# Patient Record
(demographics unavailable — no encounter records)

---

## 2024-10-07 NOTE — PHYSICAL EXAM
[JVD] : no jugular venous distention  [Normal Breath Sounds] : Normal breath sounds [Normal Heart Sounds] : normal heart sounds [Alert] : alert [Oriented to Person] : oriented to person [Oriented to Place] : oriented to place [Oriented to Time] : oriented to time [Calm] : calm [de-identified] : soft, no recurrence [de-identified] : moves all 4 extr 5/5, no swelling

## 2024-10-07 NOTE — PHYSICAL EXAM
[Chaperone Declined] : Patient declined chaperone [Appropriately responsive] : appropriately responsive [Alert] : alert [No Acute Distress] : no acute distress [Soft] : soft [Non-tender] : non-tender [Non-distended] : non-distended [No HSM] : No HSM [No Lesions] : no lesions [No Mass] : no mass [Oriented x3] : oriented x3 [Labia Majora] : normal [Labia Minora] : normal [Normal] : normal [Absent] : absent [Uterine Adnexae] : normal [FreeTextEntry7] : INCISIONS HEALING WELL

## 2024-12-16 NOTE — PLAN
[FreeTextEntry1] : 49 YO FEMALE PRESENTS FOR ANNUAL GYN EXAMINATION. SELF BREAST EXAMINATION TAUGHT. MAMMOGRAM ORDERED. EXERCISE, CALCIUM AND VITAMIN D3 SUPPLEMENTATION DISCUSSED. BONE DENSITY STUDY AND INDICATIONS REVIEWED. COLONOSCOPY HISTORY REVIEWED AND DISCUSSED FOLLOWUP.

## 2024-12-16 NOTE — PHYSICAL EXAM
[Chaperone Declined] : Patient declined chaperone [Appropriately responsive] : appropriately responsive [Alert] : alert [No Acute Distress] : no acute distress [Soft] : soft [Non-tender] : non-tender [Non-distended] : non-distended [No HSM] : No HSM [No Lesions] : no lesions [No Mass] : no mass [Oriented x3] : oriented x3 [Examination Of The Breasts] : a normal appearance [No Masses] : no breast masses were palpable [Labia Minora] : normal [Labia Majora] : normal [Normal] : normal [Absent] : absent

## 2024-12-16 NOTE — HISTORY OF PRESENT ILLNESS
[FreeTextEntry1] : 47 YO F PRESENTS FOR ANNUAL VISIT. PT FEELS WELL AND OFFERS NO COMPLAINTS. S/P PARTIAL HYSTERECTOMY 9/24/24. PT DENIES ANY ISSUES AFTER SURGERY.  LPAP 11/7/22: NEGATIVE LMAMMO 1/24/24: BIRADS-2

## 2025-02-24 NOTE — ASSESSMENT
[FreeTextEntry1] : Annual ordered comprehensive blood work , screen for hyperlipidemia , diabetes and thyroid disorder. will review the results and address if abnormal. she is up to date on mammo and PAP : result  normal. Deferred  flu vaccine up to date in  tdap vaccine. Declined for HIV and Hep C  screening test. alcohol screening :SIBRT:4 Depression screening:PHQ2:0 up to date with   colonoscopy, she need to have repeat in 3 yrs. EKG: Normal , no acute changes sleep apnea screening: low  Asthma , allergy: controlled , takes zyrtec every day   obesity: BMI:35 Advised low carb , Mediterranean diet, avoid carbonated beverage , added sugar and sweets. exercise min 150 min/wk. portion control, maintain a food diary. drink plenty of water. pt. denied any H/o thyroid CA , familial medullary CA, MEN type 2, Pancreatitis. prescribed Wegovy 0.25 mg once a week for 4 weeks then increase to 0.5 mg for 2 months follow-up in 3 months after starting on treatment.  b/l knee avani: stated she is having intermittent pain

## 2025-02-24 NOTE — HISTORY OF PRESENT ILLNESS
[FreeTextEntry1] : pt. here for well ness exam [de-identified] : Ms. OPHELIA ROSENTHAL  is  for annual wellness exam. Asthma symptoms are well controlled , no cough takes  Breo as needed, continues to take Zyrtec for allergy.   Patient stated she is having difficulty losing weight. she has seen nutritionist , she tried wt. loss stanley, she exercise every day , nothing is helping her to loose wt. no change is bowel and bladder habit .

## 2025-02-24 NOTE — HEALTH RISK ASSESSMENT
[Excellent] : ~his/her~  mood as  excellent [3 or 4 (1 pt)] : 3 or 4  (1 point) [No] : In the past 12 months have you used drugs other than those required for medical reasons? No [No falls in past year] : Patient reported no falls in the past year [Little interest or pleasure doing things] : 1) Little interest or pleasure doing things [Feeling down, depressed, or hopeless] : 2) Feeling down, depressed, or hopeless [0] : 2) Feeling down, depressed, or hopeless: Not at all (0) [PHQ-2 Negative - No further assessment needed] : PHQ-2 Negative - No further assessment needed [Never] : Never [Patient reported mammogram was normal] : Patient reported mammogram was normal [Patient reported PAP Smear was normal] : Patient reported PAP Smear was normal [Patient reported colonoscopy was normal] : Patient reported colonoscopy was normal [HIV test declined] : HIV test declined [Hepatitis C test declined] : Hepatitis C test declined [None] : None [With Family] : lives with family [Employed] : employed [Unemployed] : unemployed [] :  [Sexually Active] : sexually active [Feels Safe at Home] : Feels safe at home [Fully functional (bathing, dressing, toileting, transferring, walking, feeding)] : Fully functional (bathing, dressing, toileting, transferring, walking, feeding) [Fully functional (using the telephone, shopping, preparing meals, housekeeping, doing laundry, using] : Fully functional and needs no help or supervision to perform IADLs (using the telephone, shopping, preparing meals, housekeeping, doing laundry, using transportation, managing medications and managing finances) [Smoke Detector] : smoke detector [Carbon Monoxide Detector] : carbon monoxide detector [Seat Belt] :  uses seat belt [Reviewed no changes] : Reviewed, no changes [Designated Healthcare Proxy] : Designated healthcare proxy [Relationship: ___] : Relationship: [unfilled] [Yes] : Yes [2 - 3 times a week (3 pts)] : 2 - 3  times a week (3 points) [NO] : No [Audit-CScore] : 4 [de-identified] : does regular exercise [de-identified] : eats healthy [YLE2Tsizm] : 0 [Change in mental status noted] : No change in mental status noted [Language] : denies difficulty with language [Behavior] : denies difficulty with behavior [Learning/Retaining New Information] : denies difficulty learning/retaining new information [Handling Complex Tasks] : denies difficulty handling complex tasks [Reasoning] : denies difficulty with reasoning [Reports changes in hearing] : Reports no changes in hearing [Reports changes in vision] : Reports no changes in vision [Reports changes in dental health] : Reports no changes in dental health [MammogramDate] : 02/25 [PapSmearDate] : 11/22 [PapSmearComments] : hysterectomy status , intact ovaries. [ColonoscopyDate] : 05/23 [ColonoscopyComments] : REPEAT IN 3 YRS. [AdvancecareDate] : 2/23

## 2025-02-24 NOTE — COUNSELING
[Potential consequences of obesity discussed] : Potential consequences of obesity discussed [Structured Weight Management Program suggested:] : Structured weight management program suggested [Encouraged to maintain food diary] : Encouraged to maintain food diary [Encouraged to increase physical activity] : Encouraged to increase physical activity [Encouraged to use exercise tracking device] : Encouraged to use exercise tracking device [Decrease Portions] : decrease portions [____ min/wk Activity] : [unfilled] min/wk activity [Keep Food Diary] : keep food diary [AUDIT-C Screening administered and reviewed] : AUDIT-C Screening administered and reviewed [Hazards of at-risk alcohol use discussed] : Hazards of at-risk alcohol use discussed [FreeTextEntry2] : advised weight training, increase water intake. low carb and high protein diet. [FreeTextEntry4] : 15

## 2025-05-02 NOTE — ASSESSMENT
[FreeTextEntry1] :  obesity: BMI:32  started on treatment:02/28/25 weight at initiation of treatment:186 lb current wt.: 171 lb S/E of treatment: none Medication and dose:wegovy 0.5 mg ordered labs. Advised low carb , Mediterranean diet,avoid carbonated beverage , added sugar and sweets. exercise min 150 min/wk. portion control, maintain a food diary. drink plenty of water.

## 2025-05-02 NOTE — HISTORY OF PRESENT ILLNESS
[FreeTextEntry1] : Obesity and weight management [de-identified] : Ms. ROSENTHAL is here today for a f/u visit. over all she is doing well. Patient was started on Wegovy last visit for weight loss she is tolerating medication well she had lost 15 pounds of weight after starting on treatment

## 2025-05-02 NOTE — HISTORY OF PRESENT ILLNESS
[FreeTextEntry1] : Obesity and weight management [de-identified] : Ms. ROSENTHAL is here today for a f/u visit. over all she is doing well. Patient was started on Wegovy last visit for weight loss she is tolerating medication well she had lost 15 pounds of weight after starting on treatment